# Patient Record
Sex: MALE | Race: BLACK OR AFRICAN AMERICAN
[De-identification: names, ages, dates, MRNs, and addresses within clinical notes are randomized per-mention and may not be internally consistent; named-entity substitution may affect disease eponyms.]

---

## 2020-10-09 NOTE — REPVR
PROCEDURE INFORMATION: 

Exam: CT Head Without Contrast 

Exam date and time: 10/9/2020 3:03 PM 

Age: 30 years old 

Clinical indication: Injury or trauma; Fall; Blunt trauma (contusions or 

hematomas); Additional info: Fall from 2nd story 



TECHNIQUE: 

Imaging protocol: Computed tomography of the head without contrast. 

Radiation optimization: All CT scans at this facility use at least one of these 

dose optimization techniques: automated exposure control; mA and/or kV 

adjustment per patient size (includes targeted exams where dose is matched to 

clinical indication); or iterative reconstruction. 



COMPARISON: 

No relevant prior studies available. 



FINDINGS: 

Brain: There is no evidence of infarct, grey-white matter differentiation is 

preserved. There is no hemorrhage or extra-axial collection. There is no mass. 

Cerebral ventricles: There is no hydrocephalus. 

Bones/joints: Unremarkable. No acute fracture. 

Paranasal sinuses: Visualized sinuses are unremarkable. No fluid levels. 

Mastoid air cells: Visualized mastoid air cells are well aerated. 

Soft tissues: Unremarkable. 



IMPRESSION: 

No intracranial injury or lesion. 



Electronically signed by: Sharif Bey On 10/09/2020  17:19:49 PM

## 2020-10-09 NOTE — REPVR
PROCEDURE INFORMATION: 

Exam: CT Abdomen And Pelvis Without Contrast 

Exam date and time: 10/9/2020 4:38 PM 

Age: 30 years old 

Clinical indication: Injury or trauma; Fall; Blunt; Generalized; Additional 

info: Fall from 2nd story 



TECHNIQUE: 

Imaging protocol: Computed tomography of the abdomen and pelvis without 

contrast. 

Radiation optimization: All CT scans at this facility use at least one of these 

dose optimization techniques: automated exposure control; mA and/or kV 

adjustment per patient size (includes targeted exams where dose is matched to 

clinical indication); or iterative reconstruction. 



COMPARISON: 

No relevant prior studies available. 



FINDINGS: 

Liver: The liver is normal. 

Gallbladder and bile ducts: The gallbladder is normal.No calcified calculi. 

Normal bile ducts. 

Pancreas: The pancreas is normal. 

Spleen: The spleen is normal. 

Adrenals: The adrenals are normal. 

Kidneys and ureters: The kidneys are normal.No hydronephrosis. 

Stomach and bowel: Unremarkable. No obstruction. No mucosal thickening. 

Appendix: The appendix is well visualized and is normal. 



Intraperitoneal space: There is no free fluid or fluid collection. There is no 

free air. There is no evidence of hemoperitoneum. 

Retroperitoneal space: There is no evidence of retroperitoneal hemorrhage. 

Vasculature: Unremarkable. No abdominal aortic aneurysm. 

Lymph nodes: Unremarkable. No enlarged lymph nodes. 

Urinary bladder: The bladder is normal with no evidence of calculi. 

Reproductive: Unremarkable as visualized. 

Bones/joints: Unremarkable. No acute fracture. 

Soft tissues: Unremarkable. 



IMPRESSION: 

No abdominal or pelvic injury. 



Electronically signed by: Sharif Bey On 10/09/2020  17:30:10 PM

## 2020-10-09 NOTE — REPVR
PROCEDURE INFORMATION: 

Exam: XR Chest, 1 View 

Exam date and time: 10/9/2020 3:51 PM 

Age: 30 years old 

Clinical indication: Injury or trauma; Fall; Sprain or strain 



TECHNIQUE: 

Imaging protocol: XR of the chest 

Views: 1 view. 



COMPARISON: 

No relevant prior studies available. 



FINDINGS: 

Lungs: Unremarkable. No consolidation. 

Pleural space: Unremarkable. No pleural effusion. No pneumothorax. 

Heart/Mediastinum: Unremarkable. No cardiomegaly. 

Bones/joints: Unremarkable. 



IMPRESSION: 

No acute findings. 



Electronically signed by: Sharif Bey On 10/09/2020  16:16:11 PM

## 2020-10-09 NOTE — REPVR
PROCEDURE INFORMATION: 

Exam: XR Right Ankle 

Exam date and time: 10/9/2020 3:51 PM 

Age: 30 years old 

Clinical indication: Injury or trauma; Fall; Fracture, traumatic; Open 

fracture, severity classification not provided; Fibula and tibia; Right 



TECHNIQUE: 

Imaging protocol: XR Right ankle. 

Views: 1 or 2 views. 



COMPARISON: 

No relevant prior studies available. 



FINDINGS: 

Bones/joints: There are transverse fractures of the distal tibia and fibula 

with comminution. There is 3 cm lateral displacement and 2 cm of anterior 

displacement at the fracture sites. Small foci of gas in the soft tissue are 

consistent with an open fracture. The ankle joint is intact. The fractures are 

approximately 10 cm proximal to the joint. 

Soft tissues: Normal. 



IMPRESSION: 

Comminuted and markedly displaced transverse fractures of the distal tibia and 

fibula. 



Electronically signed by: Sharif Bey On 10/09/2020  16:17:52 PM

## 2020-10-09 NOTE — REPVR
PROCEDURE INFORMATION: 

Exam: XR Right Tibia and Fibula 

Exam date and time: 10/9/2020 3:51 PM 

Age: 30 years old 

Clinical indication: Injury or trauma; Fall; Fracture, traumatic; Open 

fracture, severity classification not provided; Fibula and tibia; Right 



TECHNIQUE: 

Imaging protocol: XR Right tibia and fibula. 

Views: 2 views. 



COMPARISON: 

No relevant prior studies available. 



FINDINGS: 

Bones/joints: Limited, including only the proximal to mid tibia and fibula. 

There is no fracture. 

Soft tissues: There is gas within the soft tissue in the mid lower leg at the 

inferior edge of this study. 



IMPRESSION: 

No fracture of the proximal to mid tibia or fibula. See ankle x-ray report for 

distal tibia and fibula. 



Electronically signed by: Sharif Bey On 10/09/2020  16:15:44 PM

## 2020-10-09 NOTE — REPVR
PROCEDURE INFORMATION: 

Exam: CT Chest Without Contrast 

Exam date and time: 10/9/2020 4:38 PM 

Age: 30 years old 

Clinical indication: Injury or trauma; Fall; Blunt trauma (contusions or 

hematomas); Additional info: Fall from 2nd story 



TECHNIQUE: 

Imaging protocol: Computed tomography of the chest without contrast. 

Radiation optimization: All CT scans at this facility use at least one of these 

dose optimization techniques: automated exposure control; mA and/or kV 

adjustment per patient size (includes targeted exams where dose is matched to 

clinical indication); or iterative reconstruction. 



COMPARISON: 

CR PORTABLE CHEST X-RAY 10/9/2020 3:30 PM 



FINDINGS: 

Lungs: There is no lung consolidation or contusion. 

Pleural space: There is no pleural effusion or hemothorax. There is no 

pneumothorax. 

Heart: Unremarkable. No cardiomegaly. No pericardial effusion. 

Mediastinal space: There is no mediastinal hematoma. There is no 

pneumomediastinum. 

Aorta: Unremarkable. No aortic aneurysm. 

Lymph nodes: Unremarkable. No enlarged lymph nodes. 



Bones/joints: Unremarkable. No acute fracture. 

Soft tissues: Unremarkable. 



IMPRESSION: 

No thoracic injury. 



Electronically signed by: Sharif Bey On 10/09/2020  17:24:14 PM

## 2020-10-09 NOTE — REPVR
PROCEDURE INFORMATION: 

Exam: CT Cervical Spine Without Contrast 

Exam date and time: 10/9/2020 3:03 PM 

Age: 30 years old 

Clinical indication: Injury or trauma; Fall; Blunt trauma; Additional info: 

Fall from 2nd story 



TECHNIQUE: 

Imaging protocol: Computed tomography images of the cervical spine without 

contrast. 

Radiation optimization: All CT scans at this facility use at least one of these 

dose optimization techniques: automated exposure control; mA and/or kV 

adjustment per patient size (includes targeted exams where dose is matched to 

clinical indication); or iterative reconstruction. 



COMPARISON: 

No relevant prior studies available. 



FINDINGS: 

Vertebrae: There is no evidence of fracture. Vertebral alignment is normal. 

Discs/Spinal canal/Neural foramina: There is no central or foraminal stenosis. 

There is no significant disc disease. 



Soft tissues: Unremarkable. 

Lungs: Lung apices are normal. 



IMPRESSION: 

No fracture of the cervical spine. 



Electronically signed by: Sharif Bey On 10/09/2020  17:17:42 PM

## 2021-02-22 ENCOUNTER — HOSPITAL ENCOUNTER (OUTPATIENT)
Dept: HOSPITAL 53 - M LAB REF | Age: 31
End: 2021-02-22
Attending: SURGERY
Payer: COMMERCIAL

## 2021-02-22 DIAGNOSIS — A56.8: Primary | ICD-10-CM

## 2021-02-22 LAB
CHLAMYDIA DNA AMPLIFICATION: NEGATIVE
N GONORRHOEA RRNA SPEC QL NAA+PROBE: NEGATIVE